# Patient Record
Sex: MALE | Race: BLACK OR AFRICAN AMERICAN | Employment: OTHER | URBAN - METROPOLITAN AREA
[De-identification: names, ages, dates, MRNs, and addresses within clinical notes are randomized per-mention and may not be internally consistent; named-entity substitution may affect disease eponyms.]

---

## 2024-09-07 ENCOUNTER — HOSPITAL ENCOUNTER (INPATIENT)
Facility: HOSPITAL | Age: 39
LOS: 3 days | Discharge: HOME | DRG: 420 | End: 2024-09-10
Attending: FAMILY MEDICINE | Admitting: HOSPITALIST
Payer: MEDICAID

## 2024-09-07 DIAGNOSIS — E11.00 HYPEROSMOLAR HYPERGLYCEMIC STATE (HHS) (HCC): Primary | ICD-10-CM

## 2024-09-07 DIAGNOSIS — E11.65 TYPE 2 DIABETES MELLITUS WITH HYPERGLYCEMIA (HCC): ICD-10-CM

## 2024-09-07 PROBLEM — I10 ESSENTIAL HYPERTENSION: Status: ACTIVE | Noted: 2024-09-07

## 2024-09-07 LAB
ALBUMIN SERPL-MCNC: 3.8 G/DL (ref 3.5–5)
ALBUMIN/GLOB SERPL: 0.5 (ref 1.1–2.2)
ALP SERPL-CCNC: 195 U/L (ref 45–117)
ALT SERPL-CCNC: 80 U/L (ref 12–78)
ANION GAP SERPL CALC-SCNC: 16 MMOL/L (ref 2–12)
ANION GAP SERPL CALC-SCNC: 9 MMOL/L (ref 2–12)
APPEARANCE UR: CLEAR
AST SERPL W P-5'-P-CCNC: 45 U/L (ref 15–37)
BACTERIA URNS QL MICRO: NEGATIVE /HPF
BASE DEFICIT BLDV-SCNC: 5.1 MMOL/L
BASOPHILS # BLD: 0.1 K/UL (ref 0–0.1)
BASOPHILS NFR BLD: 1 % (ref 0–1)
BILIRUB SERPL-MCNC: 0.6 MG/DL (ref 0.2–1)
BILIRUB UR QL: NEGATIVE
BNP SERPL-MCNC: 42 PG/ML
BUN SERPL-MCNC: 25 MG/DL (ref 6–20)
BUN SERPL-MCNC: 28 MG/DL (ref 6–20)
BUN/CREAT SERPL: 15 (ref 12–20)
BUN/CREAT SERPL: 18 (ref 12–20)
CA-I BLD-MCNC: 10 MG/DL (ref 8.5–10.1)
CA-I BLD-MCNC: 9.4 MG/DL (ref 8.5–10.1)
CHLORIDE SERPL-SCNC: 102 MMOL/L (ref 97–108)
CHLORIDE SERPL-SCNC: 89 MMOL/L (ref 97–108)
CO2 SERPL-SCNC: 23 MMOL/L (ref 21–32)
CO2 SERPL-SCNC: 23 MMOL/L (ref 21–32)
COLOR UR: YELLOW
CREAT SERPL-MCNC: 1.37 MG/DL (ref 0.7–1.3)
CREAT SERPL-MCNC: 1.82 MG/DL (ref 0.7–1.3)
DIFFERENTIAL METHOD BLD: ABNORMAL
EOSINOPHIL # BLD: 0 K/UL (ref 0–0.4)
EOSINOPHIL NFR BLD: 0 % (ref 0–7)
EPITH CASTS URNS QL MICRO: ABNORMAL /LPF
ERYTHROCYTE [DISTWIDTH] IN BLOOD BY AUTOMATED COUNT: 11.8 % (ref 11.5–14.5)
EST. AVERAGE GLUCOSE BLD GHB EST-MCNC: 269 MG/DL
GLOBULIN SER CALC-MCNC: 7.1 G/DL (ref 2–4)
GLUCOSE BLD STRIP.AUTO-MCNC: 249 MG/DL (ref 65–100)
GLUCOSE BLD STRIP.AUTO-MCNC: 250 MG/DL (ref 65–100)
GLUCOSE BLD STRIP.AUTO-MCNC: 307 MG/DL (ref 65–100)
GLUCOSE BLD STRIP.AUTO-MCNC: 353 MG/DL (ref 65–100)
GLUCOSE BLD STRIP.AUTO-MCNC: 370 MG/DL (ref 65–100)
GLUCOSE BLD STRIP.AUTO-MCNC: 461 MG/DL (ref 65–100)
GLUCOSE BLD STRIP.AUTO-MCNC: 567 MG/DL (ref 65–100)
GLUCOSE BLD STRIP.AUTO-MCNC: >600 MG/DL (ref 65–100)
GLUCOSE BLD STRIP.AUTO-MCNC: >600 MG/DL (ref 65–100)
GLUCOSE SERPL-MCNC: 309 MG/DL (ref 65–100)
GLUCOSE SERPL-MCNC: 939 MG/DL (ref 65–100)
GLUCOSE UR STRIP.AUTO-MCNC: >1000 MG/DL
HBA1C MFR BLD: 11 % (ref 4–5.6)
HCO3 BLDV-SCNC: 21.8 MMOL/L (ref 22–26)
HCT VFR BLD AUTO: 45.6 % (ref 36.6–50.3)
HGB BLD-MCNC: 15.1 G/DL (ref 12.1–17)
HGB UR QL STRIP: NEGATIVE
IMM GRANULOCYTES # BLD AUTO: 0 K/UL (ref 0–0.04)
IMM GRANULOCYTES NFR BLD AUTO: 0 % (ref 0–0.5)
KETONES UR QL STRIP.AUTO: 50 MG/DL
LEUKOCYTE ESTERASE UR QL STRIP.AUTO: NEGATIVE
LIPASE SERPL-CCNC: 47 U/L (ref 13–75)
LYMPHOCYTES # BLD: 1 K/UL (ref 0.8–3.5)
LYMPHOCYTES NFR BLD: 12 % (ref 12–49)
MAGNESIUM SERPL-MCNC: 2.8 MG/DL (ref 1.6–2.4)
MCH RBC QN AUTO: 28.9 PG (ref 26–34)
MCHC RBC AUTO-ENTMCNC: 33.1 G/DL (ref 30–36.5)
MCV RBC AUTO: 87.2 FL (ref 80–99)
MONOCYTES # BLD: 0.3 K/UL (ref 0–1)
MONOCYTES NFR BLD: 4 % (ref 5–13)
NEUTS SEG # BLD: 7 K/UL (ref 1.8–8)
NEUTS SEG NFR BLD: 83 % (ref 32–75)
NITRITE UR QL STRIP.AUTO: NEGATIVE
PCO2 BLDV: 45.8 MMHG (ref 41–52)
PERFORMED BY:: ABNORMAL
PH BLDV: 7.29 (ref 7.23–7.44)
PH UR STRIP: 5 (ref 5–8)
PHOSPHATE SERPL-MCNC: 3.2 MG/DL (ref 2.6–4.7)
PLATELET # BLD AUTO: 407 K/UL (ref 150–400)
PMV BLD AUTO: 10.4 FL (ref 8.9–12.9)
PO2 BLDV: 43 MMHG (ref 25–40)
POTASSIUM SERPL-SCNC: 4.2 MMOL/L (ref 3.5–5.1)
POTASSIUM SERPL-SCNC: 5.6 MMOL/L (ref 3.5–5.1)
PROT SERPL-MCNC: 10.9 G/DL (ref 6.4–8.2)
PROT UR STRIP-MCNC: NEGATIVE MG/DL
RBC # BLD AUTO: 5.23 M/UL (ref 4.1–5.7)
RBC #/AREA URNS HPF: ABNORMAL /HPF (ref 0–5)
SAO2 % BLDV: 72.3 %
SODIUM SERPL-SCNC: 128 MMOL/L (ref 136–145)
SODIUM SERPL-SCNC: 134 MMOL/L (ref 136–145)
SP GR UR REFRACTOMETRY: 1.01 (ref 1–1.03)
SPECIMEN TYPE: ABNORMAL
URINE CULTURE IF INDICATED: ABNORMAL
UROBILINOGEN UR QL STRIP.AUTO: 0.1 EU/DL (ref 0.2–1)
WBC # BLD AUTO: 8.4 K/UL (ref 4.1–11.1)
WBC URNS QL MICRO: ABNORMAL /HPF (ref 0–4)

## 2024-09-07 PROCEDURE — 2580000003 HC RX 258: Performed by: FAMILY MEDICINE

## 2024-09-07 PROCEDURE — 96374 THER/PROPH/DIAG INJ IV PUSH: CPT

## 2024-09-07 PROCEDURE — 83880 ASSAY OF NATRIURETIC PEPTIDE: CPT

## 2024-09-07 PROCEDURE — 85025 COMPLETE CBC W/AUTO DIFF WBC: CPT

## 2024-09-07 PROCEDURE — 99285 EMERGENCY DEPT VISIT HI MDM: CPT

## 2024-09-07 PROCEDURE — 82962 GLUCOSE BLOOD TEST: CPT

## 2024-09-07 PROCEDURE — 6370000000 HC RX 637 (ALT 250 FOR IP): Performed by: HOSPITALIST

## 2024-09-07 PROCEDURE — 81001 URINALYSIS AUTO W/SCOPE: CPT

## 2024-09-07 PROCEDURE — 86341 ISLET CELL ANTIBODY: CPT

## 2024-09-07 PROCEDURE — 96361 HYDRATE IV INFUSION ADD-ON: CPT

## 2024-09-07 PROCEDURE — 83690 ASSAY OF LIPASE: CPT

## 2024-09-07 PROCEDURE — 2000000000 HC ICU R&B

## 2024-09-07 PROCEDURE — 83735 ASSAY OF MAGNESIUM: CPT

## 2024-09-07 PROCEDURE — 2580000003 HC RX 258: Performed by: HOSPITALIST

## 2024-09-07 PROCEDURE — 83036 HEMOGLOBIN GLYCOSYLATED A1C: CPT

## 2024-09-07 PROCEDURE — 6370000000 HC RX 637 (ALT 250 FOR IP): Performed by: FAMILY MEDICINE

## 2024-09-07 PROCEDURE — 84681 ASSAY OF C-PEPTIDE: CPT

## 2024-09-07 PROCEDURE — 80048 BASIC METABOLIC PNL TOTAL CA: CPT

## 2024-09-07 PROCEDURE — 80053 COMPREHEN METABOLIC PANEL: CPT

## 2024-09-07 PROCEDURE — 96360 HYDRATION IV INFUSION INIT: CPT

## 2024-09-07 PROCEDURE — 6360000002 HC RX W HCPCS: Performed by: HOSPITALIST

## 2024-09-07 PROCEDURE — 84100 ASSAY OF PHOSPHORUS: CPT

## 2024-09-07 RX ORDER — ENOXAPARIN SODIUM 100 MG/ML
30 INJECTION SUBCUTANEOUS 2 TIMES DAILY
Status: DISCONTINUED | OUTPATIENT
Start: 2024-09-07 | End: 2024-09-10 | Stop reason: HOSPADM

## 2024-09-07 RX ORDER — POTASSIUM CHLORIDE 7.45 MG/ML
10 INJECTION INTRAVENOUS PRN
Status: DISCONTINUED | OUTPATIENT
Start: 2024-09-07 | End: 2024-09-10 | Stop reason: HOSPADM

## 2024-09-07 RX ORDER — DEXTROSE MONOHYDRATE AND SODIUM CHLORIDE 5; .45 G/100ML; G/100ML
INJECTION, SOLUTION INTRAVENOUS CONTINUOUS PRN
Status: DISCONTINUED | OUTPATIENT
Start: 2024-09-07 | End: 2024-09-08

## 2024-09-07 RX ORDER — MAGNESIUM SULFATE IN WATER 40 MG/ML
2000 INJECTION, SOLUTION INTRAVENOUS PRN
Status: DISCONTINUED | OUTPATIENT
Start: 2024-09-07 | End: 2024-09-07

## 2024-09-07 RX ORDER — DEXTROSE MONOHYDRATE AND SODIUM CHLORIDE 5; .45 G/100ML; G/100ML
INJECTION, SOLUTION INTRAVENOUS CONTINUOUS PRN
Status: DISCONTINUED | OUTPATIENT
Start: 2024-09-07 | End: 2024-09-07

## 2024-09-07 RX ORDER — SODIUM CHLORIDE 450 MG/100ML
INJECTION, SOLUTION INTRAVENOUS CONTINUOUS
Status: DISCONTINUED | OUTPATIENT
Start: 2024-09-07 | End: 2024-09-08

## 2024-09-07 RX ORDER — 0.9 % SODIUM CHLORIDE 0.9 %
1000 INTRAVENOUS SOLUTION INTRAVENOUS
Status: COMPLETED | OUTPATIENT
Start: 2024-09-07 | End: 2024-09-07

## 2024-09-07 RX ORDER — AMLODIPINE BESYLATE 5 MG/1
5 TABLET ORAL DAILY
Status: ON HOLD | COMMUNITY
End: 2024-09-10 | Stop reason: HOSPADM

## 2024-09-07 RX ORDER — MAGNESIUM SULFATE IN WATER 40 MG/ML
2000 INJECTION, SOLUTION INTRAVENOUS PRN
Status: DISCONTINUED | OUTPATIENT
Start: 2024-09-07 | End: 2024-09-10 | Stop reason: HOSPADM

## 2024-09-07 RX ORDER — ONDANSETRON 4 MG/1
4 TABLET, ORALLY DISINTEGRATING ORAL EVERY 8 HOURS PRN
Status: DISCONTINUED | OUTPATIENT
Start: 2024-09-07 | End: 2024-09-10 | Stop reason: HOSPADM

## 2024-09-07 RX ORDER — ONDANSETRON 2 MG/ML
4 INJECTION INTRAMUSCULAR; INTRAVENOUS EVERY 6 HOURS PRN
Status: DISCONTINUED | OUTPATIENT
Start: 2024-09-07 | End: 2024-09-10 | Stop reason: HOSPADM

## 2024-09-07 RX ORDER — SODIUM CHLORIDE 0.9 % (FLUSH) 0.9 %
5-40 SYRINGE (ML) INJECTION PRN
Status: DISCONTINUED | OUTPATIENT
Start: 2024-09-07 | End: 2024-09-10 | Stop reason: HOSPADM

## 2024-09-07 RX ORDER — SODIUM CHLORIDE 0.9 % (FLUSH) 0.9 %
5-40 SYRINGE (ML) INJECTION EVERY 12 HOURS SCHEDULED
Status: DISCONTINUED | OUTPATIENT
Start: 2024-09-07 | End: 2024-09-10 | Stop reason: HOSPADM

## 2024-09-07 RX ORDER — POTASSIUM CHLORIDE 7.45 MG/ML
10 INJECTION INTRAVENOUS PRN
Status: DISCONTINUED | OUTPATIENT
Start: 2024-09-07 | End: 2024-09-07

## 2024-09-07 RX ORDER — ACETAMINOPHEN 325 MG/1
650 TABLET ORAL EVERY 6 HOURS PRN
Status: DISCONTINUED | OUTPATIENT
Start: 2024-09-07 | End: 2024-09-10 | Stop reason: HOSPADM

## 2024-09-07 RX ORDER — ACETAMINOPHEN 650 MG/1
650 SUPPOSITORY RECTAL EVERY 6 HOURS PRN
Status: DISCONTINUED | OUTPATIENT
Start: 2024-09-07 | End: 2024-09-10 | Stop reason: HOSPADM

## 2024-09-07 RX ORDER — POLYETHYLENE GLYCOL 3350 17 G/17G
17 POWDER, FOR SOLUTION ORAL DAILY PRN
Status: DISCONTINUED | OUTPATIENT
Start: 2024-09-07 | End: 2024-09-10 | Stop reason: HOSPADM

## 2024-09-07 RX ORDER — SODIUM CHLORIDE 9 MG/ML
INJECTION, SOLUTION INTRAVENOUS PRN
Status: DISCONTINUED | OUTPATIENT
Start: 2024-09-07 | End: 2024-09-10 | Stop reason: HOSPADM

## 2024-09-07 RX ORDER — SODIUM CHLORIDE 9 MG/ML
INJECTION, SOLUTION INTRAVENOUS CONTINUOUS
Status: DISCONTINUED | OUTPATIENT
Start: 2024-09-07 | End: 2024-09-07

## 2024-09-07 RX ADMIN — INSULIN HUMAN 8 UNITS/HR: 1 INJECTION, SOLUTION INTRAVENOUS at 18:07

## 2024-09-07 RX ADMIN — SODIUM CHLORIDE: 4.5 INJECTION, SOLUTION INTRAVENOUS at 21:50

## 2024-09-07 RX ADMIN — SODIUM CHLORIDE: 4.5 INJECTION, SOLUTION INTRAVENOUS at 17:39

## 2024-09-07 RX ADMIN — INSULIN HUMAN 10.8 UNITS/HR: 1 INJECTION, SOLUTION INTRAVENOUS at 15:59

## 2024-09-07 RX ADMIN — SODIUM CHLORIDE 1000 ML: 9 INJECTION, SOLUTION INTRAVENOUS at 15:44

## 2024-09-07 RX ADMIN — INSULIN HUMAN 10 UNITS: 100 INJECTION, SOLUTION PARENTERAL at 15:22

## 2024-09-07 RX ADMIN — ENOXAPARIN SODIUM 30 MG: 100 INJECTION SUBCUTANEOUS at 21:09

## 2024-09-07 RX ADMIN — POTASSIUM CHLORIDE 10 MEQ: 7.46 INJECTION, SOLUTION INTRAVENOUS at 23:54

## 2024-09-07 RX ADMIN — POTASSIUM CHLORIDE 10 MEQ: 7.46 INJECTION, SOLUTION INTRAVENOUS at 22:24

## 2024-09-07 RX ADMIN — SODIUM CHLORIDE, PRESERVATIVE FREE 10 ML: 5 INJECTION INTRAVENOUS at 21:10

## 2024-09-07 RX ADMIN — DEXTROSE AND SODIUM CHLORIDE: 5; 450 INJECTION, SOLUTION INTRAVENOUS at 23:02

## 2024-09-07 RX ADMIN — INSULIN HUMAN 10.8 UNITS/HR: 1 INJECTION, SOLUTION INTRAVENOUS at 15:55

## 2024-09-07 RX ADMIN — SODIUM CHLORIDE 1000 ML: 9 INJECTION, SOLUTION INTRAVENOUS at 14:43

## 2024-09-07 ASSESSMENT — PAIN SCALES - GENERAL
PAINLEVEL_OUTOF10: 0
PAINLEVEL_OUTOF10: 8
PAINLEVEL_OUTOF10: 10
PAINLEVEL_OUTOF10: 8

## 2024-09-07 ASSESSMENT — LIFESTYLE VARIABLES
HOW OFTEN DO YOU HAVE A DRINK CONTAINING ALCOHOL: NEVER
HOW MANY STANDARD DRINKS CONTAINING ALCOHOL DO YOU HAVE ON A TYPICAL DAY: PATIENT DOES NOT DRINK

## 2024-09-07 ASSESSMENT — PAIN DESCRIPTION - LOCATION
LOCATION: ABDOMEN
LOCATION: ABDOMEN

## 2024-09-07 ASSESSMENT — PAIN DESCRIPTION - ORIENTATION
ORIENTATION: RIGHT;LOWER
ORIENTATION: RIGHT;LOWER

## 2024-09-07 ASSESSMENT — PAIN - FUNCTIONAL ASSESSMENT: PAIN_FUNCTIONAL_ASSESSMENT: 0-10

## 2024-09-08 LAB
ANION GAP SERPL CALC-SCNC: 8 MMOL/L (ref 2–12)
BUN SERPL-MCNC: 22 MG/DL (ref 6–20)
BUN/CREAT SERPL: 21 (ref 12–20)
CA-I BLD-MCNC: 8.5 MG/DL (ref 8.5–10.1)
CHLORIDE SERPL-SCNC: 104 MMOL/L (ref 97–108)
CO2 SERPL-SCNC: 23 MMOL/L (ref 21–32)
CREAT SERPL-MCNC: 1.06 MG/DL (ref 0.7–1.3)
GLUCOSE BLD STRIP.AUTO-MCNC: 149 MG/DL (ref 65–100)
GLUCOSE BLD STRIP.AUTO-MCNC: 165 MG/DL (ref 65–100)
GLUCOSE BLD STRIP.AUTO-MCNC: 177 MG/DL (ref 65–100)
GLUCOSE BLD STRIP.AUTO-MCNC: 219 MG/DL (ref 65–100)
GLUCOSE BLD STRIP.AUTO-MCNC: 222 MG/DL (ref 65–100)
GLUCOSE BLD STRIP.AUTO-MCNC: 231 MG/DL (ref 65–100)
GLUCOSE BLD STRIP.AUTO-MCNC: 234 MG/DL (ref 65–100)
GLUCOSE BLD STRIP.AUTO-MCNC: 238 MG/DL (ref 65–100)
GLUCOSE BLD STRIP.AUTO-MCNC: 240 MG/DL (ref 65–100)
GLUCOSE BLD STRIP.AUTO-MCNC: 245 MG/DL (ref 65–100)
GLUCOSE BLD STRIP.AUTO-MCNC: 246 MG/DL (ref 65–100)
GLUCOSE BLD STRIP.AUTO-MCNC: 247 MG/DL (ref 65–100)
GLUCOSE BLD STRIP.AUTO-MCNC: 247 MG/DL (ref 65–100)
GLUCOSE BLD STRIP.AUTO-MCNC: 248 MG/DL (ref 65–100)
GLUCOSE BLD STRIP.AUTO-MCNC: 355 MG/DL (ref 65–100)
GLUCOSE BLD STRIP.AUTO-MCNC: 370 MG/DL (ref 65–100)
GLUCOSE BLD STRIP.AUTO-MCNC: 375 MG/DL (ref 65–100)
GLUCOSE BLD STRIP.AUTO-MCNC: 397 MG/DL (ref 65–100)
GLUCOSE BLD STRIP.AUTO-MCNC: 427 MG/DL (ref 65–100)
GLUCOSE SERPL-MCNC: 245 MG/DL (ref 65–100)
MAGNESIUM SERPL-MCNC: 2.3 MG/DL (ref 1.6–2.4)
PERFORMED BY:: ABNORMAL
PHOSPHATE SERPL-MCNC: 3.2 MG/DL (ref 2.6–4.7)
POTASSIUM SERPL-SCNC: 3.8 MMOL/L (ref 3.5–5.1)
SODIUM SERPL-SCNC: 135 MMOL/L (ref 136–145)

## 2024-09-08 PROCEDURE — 6360000002 HC RX W HCPCS: Performed by: HOSPITALIST

## 2024-09-08 PROCEDURE — 2580000003 HC RX 258: Performed by: HOSPITALIST

## 2024-09-08 PROCEDURE — 80048 BASIC METABOLIC PNL TOTAL CA: CPT

## 2024-09-08 PROCEDURE — 83735 ASSAY OF MAGNESIUM: CPT

## 2024-09-08 PROCEDURE — 6370000000 HC RX 637 (ALT 250 FOR IP): Performed by: HOSPITALIST

## 2024-09-08 PROCEDURE — 2000000000 HC ICU R&B

## 2024-09-08 PROCEDURE — 82962 GLUCOSE BLOOD TEST: CPT

## 2024-09-08 PROCEDURE — 6370000000 HC RX 637 (ALT 250 FOR IP): Performed by: INTERNAL MEDICINE

## 2024-09-08 PROCEDURE — 36415 COLL VENOUS BLD VENIPUNCTURE: CPT

## 2024-09-08 PROCEDURE — 84100 ASSAY OF PHOSPHORUS: CPT

## 2024-09-08 RX ORDER — INSULIN GLARGINE 100 [IU]/ML
35 INJECTION, SOLUTION SUBCUTANEOUS 2 TIMES DAILY
Status: DISCONTINUED | OUTPATIENT
Start: 2024-09-08 | End: 2024-09-08

## 2024-09-08 RX ORDER — INSULIN LISPRO 100 [IU]/ML
0-8 INJECTION, SOLUTION INTRAVENOUS; SUBCUTANEOUS
Status: DISCONTINUED | OUTPATIENT
Start: 2024-09-08 | End: 2024-09-10 | Stop reason: HOSPADM

## 2024-09-08 RX ORDER — GLUCAGON 1 MG/ML
1 KIT INJECTION PRN
Status: DISCONTINUED | OUTPATIENT
Start: 2024-09-08 | End: 2024-09-10 | Stop reason: HOSPADM

## 2024-09-08 RX ORDER — INSULIN GLARGINE 100 [IU]/ML
30 INJECTION, SOLUTION SUBCUTANEOUS 2 TIMES DAILY
Status: DISCONTINUED | OUTPATIENT
Start: 2024-09-08 | End: 2024-09-08

## 2024-09-08 RX ORDER — INSULIN LISPRO 100 [IU]/ML
0-4 INJECTION, SOLUTION INTRAVENOUS; SUBCUTANEOUS NIGHTLY
Status: DISCONTINUED | OUTPATIENT
Start: 2024-09-08 | End: 2024-09-10 | Stop reason: HOSPADM

## 2024-09-08 RX ORDER — INSULIN GLARGINE 100 [IU]/ML
25 INJECTION, SOLUTION SUBCUTANEOUS 2 TIMES DAILY
Status: DISCONTINUED | OUTPATIENT
Start: 2024-09-08 | End: 2024-09-09

## 2024-09-08 RX ORDER — DEXTROSE MONOHYDRATE 100 MG/ML
INJECTION, SOLUTION INTRAVENOUS CONTINUOUS PRN
Status: DISCONTINUED | OUTPATIENT
Start: 2024-09-08 | End: 2024-09-10 | Stop reason: HOSPADM

## 2024-09-08 RX ORDER — INSULIN GLARGINE 100 [IU]/ML
25 INJECTION, SOLUTION SUBCUTANEOUS 2 TIMES DAILY
Status: DISCONTINUED | OUTPATIENT
Start: 2024-09-09 | End: 2024-09-08

## 2024-09-08 RX ORDER — INSULIN LISPRO 100 [IU]/ML
15 INJECTION, SOLUTION INTRAVENOUS; SUBCUTANEOUS
Status: DISCONTINUED | OUTPATIENT
Start: 2024-09-08 | End: 2024-09-10 | Stop reason: HOSPADM

## 2024-09-08 RX ADMIN — DEXTROSE AND SODIUM CHLORIDE: 5; 450 INJECTION, SOLUTION INTRAVENOUS at 13:08

## 2024-09-08 RX ADMIN — POTASSIUM CHLORIDE 10 MEQ: 7.46 INJECTION, SOLUTION INTRAVENOUS at 07:52

## 2024-09-08 RX ADMIN — INSULIN HUMAN 11.3 UNITS/HR: 1 INJECTION, SOLUTION INTRAVENOUS at 15:08

## 2024-09-08 RX ADMIN — POTASSIUM CHLORIDE 10 MEQ: 7.46 INJECTION, SOLUTION INTRAVENOUS at 06:45

## 2024-09-08 RX ADMIN — POTASSIUM CHLORIDE 10 MEQ: 7.46 INJECTION, SOLUTION INTRAVENOUS at 00:54

## 2024-09-08 RX ADMIN — DEXTROSE AND SODIUM CHLORIDE: 5; 450 INJECTION, SOLUTION INTRAVENOUS at 05:48

## 2024-09-08 RX ADMIN — INSULIN LISPRO 15 UNITS: 100 INJECTION, SOLUTION INTRAVENOUS; SUBCUTANEOUS at 18:51

## 2024-09-08 RX ADMIN — INSULIN HUMAN 5.3 UNITS/HR: 1 INJECTION, SOLUTION INTRAVENOUS at 03:10

## 2024-09-08 RX ADMIN — SODIUM CHLORIDE, PRESERVATIVE FREE 10 ML: 5 INJECTION INTRAVENOUS at 07:55

## 2024-09-08 RX ADMIN — SODIUM CHLORIDE, PRESERVATIVE FREE 10 ML: 5 INJECTION INTRAVENOUS at 21:26

## 2024-09-08 RX ADMIN — INSULIN GLARGINE 25 UNITS: 100 INJECTION, SOLUTION SUBCUTANEOUS at 17:25

## 2024-09-08 RX ADMIN — INSULIN LISPRO 4 UNITS: 100 INJECTION, SOLUTION INTRAVENOUS; SUBCUTANEOUS at 21:39

## 2024-09-08 RX ADMIN — ENOXAPARIN SODIUM 30 MG: 100 INJECTION SUBCUTANEOUS at 08:35

## 2024-09-08 RX ADMIN — POTASSIUM CHLORIDE 10 MEQ: 7.46 INJECTION, SOLUTION INTRAVENOUS at 08:58

## 2024-09-08 ASSESSMENT — PAIN SCALES - GENERAL
PAINLEVEL_OUTOF10: 0

## 2024-09-09 LAB
ANION GAP SERPL CALC-SCNC: 7 MMOL/L (ref 2–12)
BUN SERPL-MCNC: 17 MG/DL (ref 6–20)
BUN/CREAT SERPL: 16 (ref 12–20)
CA-I BLD-MCNC: 8.5 MG/DL (ref 8.5–10.1)
CHLORIDE SERPL-SCNC: 103 MMOL/L (ref 97–108)
CO2 SERPL-SCNC: 22 MMOL/L (ref 21–32)
CREAT SERPL-MCNC: 1.04 MG/DL (ref 0.7–1.3)
ERYTHROCYTE [DISTWIDTH] IN BLOOD BY AUTOMATED COUNT: 12.2 % (ref 11.5–14.5)
GLUCOSE BLD STRIP.AUTO-MCNC: 284 MG/DL (ref 65–100)
GLUCOSE BLD STRIP.AUTO-MCNC: 285 MG/DL (ref 65–100)
GLUCOSE BLD STRIP.AUTO-MCNC: 340 MG/DL (ref 65–100)
GLUCOSE BLD STRIP.AUTO-MCNC: 428 MG/DL (ref 65–100)
GLUCOSE SERPL-MCNC: 347 MG/DL (ref 65–100)
HCT VFR BLD AUTO: 40.1 % (ref 36.6–50.3)
HGB BLD-MCNC: 13.2 G/DL (ref 12.1–17)
MAGNESIUM SERPL-MCNC: 2.2 MG/DL (ref 1.6–2.4)
MCH RBC QN AUTO: 28.9 PG (ref 26–34)
MCHC RBC AUTO-ENTMCNC: 32.9 G/DL (ref 30–36.5)
MCV RBC AUTO: 87.7 FL (ref 80–99)
NRBC # BLD: 0 K/UL (ref 0–0.01)
NRBC BLD-RTO: 0 PER 100 WBC
PERFORMED BY:: ABNORMAL
PHOSPHATE SERPL-MCNC: 3.8 MG/DL (ref 2.6–4.7)
PLATELET # BLD AUTO: 321 K/UL (ref 150–400)
PMV BLD AUTO: 10.1 FL (ref 8.9–12.9)
POTASSIUM SERPL-SCNC: 4.2 MMOL/L (ref 3.5–5.1)
RBC # BLD AUTO: 4.57 M/UL (ref 4.1–5.7)
SODIUM SERPL-SCNC: 132 MMOL/L (ref 136–145)
TSH SERPL DL<=0.05 MIU/L-ACNC: 3.17 UIU/ML (ref 0.36–3.74)
WBC # BLD AUTO: 7.1 K/UL (ref 4.1–11.1)

## 2024-09-09 PROCEDURE — 82962 GLUCOSE BLOOD TEST: CPT

## 2024-09-09 PROCEDURE — 6370000000 HC RX 637 (ALT 250 FOR IP): Performed by: INTERNAL MEDICINE

## 2024-09-09 PROCEDURE — 85027 COMPLETE CBC AUTOMATED: CPT

## 2024-09-09 PROCEDURE — 83735 ASSAY OF MAGNESIUM: CPT

## 2024-09-09 PROCEDURE — 36415 COLL VENOUS BLD VENIPUNCTURE: CPT

## 2024-09-09 PROCEDURE — 2580000003 HC RX 258: Performed by: HOSPITALIST

## 2024-09-09 PROCEDURE — 84100 ASSAY OF PHOSPHORUS: CPT

## 2024-09-09 PROCEDURE — 6370000000 HC RX 637 (ALT 250 FOR IP): Performed by: HOSPITALIST

## 2024-09-09 PROCEDURE — 6360000002 HC RX W HCPCS: Performed by: HOSPITALIST

## 2024-09-09 PROCEDURE — 1100000000 HC RM PRIVATE

## 2024-09-09 PROCEDURE — 80048 BASIC METABOLIC PNL TOTAL CA: CPT

## 2024-09-09 PROCEDURE — 84443 ASSAY THYROID STIM HORMONE: CPT

## 2024-09-09 RX ORDER — INSULIN GLARGINE 100 [IU]/ML
35 INJECTION, SOLUTION SUBCUTANEOUS 2 TIMES DAILY
Status: DISCONTINUED | OUTPATIENT
Start: 2024-09-09 | End: 2024-09-10 | Stop reason: HOSPADM

## 2024-09-09 RX ADMIN — SODIUM CHLORIDE, PRESERVATIVE FREE 10 ML: 5 INJECTION INTRAVENOUS at 22:29

## 2024-09-09 RX ADMIN — INSULIN LISPRO 4 UNITS: 100 INJECTION, SOLUTION INTRAVENOUS; SUBCUTANEOUS at 16:53

## 2024-09-09 RX ADMIN — INSULIN GLARGINE 25 UNITS: 100 INJECTION, SOLUTION SUBCUTANEOUS at 08:32

## 2024-09-09 RX ADMIN — METFORMIN HYDROCHLORIDE 500 MG: 500 TABLET ORAL at 16:53

## 2024-09-09 RX ADMIN — INSULIN LISPRO 15 UNITS: 100 INJECTION, SOLUTION INTRAVENOUS; SUBCUTANEOUS at 08:32

## 2024-09-09 RX ADMIN — ENOXAPARIN SODIUM 30 MG: 100 INJECTION SUBCUTANEOUS at 08:32

## 2024-09-09 RX ADMIN — INSULIN LISPRO 8 UNITS: 100 INJECTION, SOLUTION INTRAVENOUS; SUBCUTANEOUS at 13:05

## 2024-09-09 RX ADMIN — ENOXAPARIN SODIUM 30 MG: 100 INJECTION SUBCUTANEOUS at 22:21

## 2024-09-09 RX ADMIN — SODIUM CHLORIDE, PRESERVATIVE FREE 10 ML: 5 INJECTION INTRAVENOUS at 08:37

## 2024-09-09 RX ADMIN — INSULIN GLARGINE 35 UNITS: 100 INJECTION, SOLUTION SUBCUTANEOUS at 22:20

## 2024-09-09 RX ADMIN — INSULIN LISPRO 15 UNITS: 100 INJECTION, SOLUTION INTRAVENOUS; SUBCUTANEOUS at 13:05

## 2024-09-09 RX ADMIN — INSULIN LISPRO 15 UNITS: 100 INJECTION, SOLUTION INTRAVENOUS; SUBCUTANEOUS at 16:53

## 2024-09-09 RX ADMIN — INSULIN LISPRO 6 UNITS: 100 INJECTION, SOLUTION INTRAVENOUS; SUBCUTANEOUS at 08:32

## 2024-09-09 ASSESSMENT — PAIN SCALES - GENERAL
PAINLEVEL_OUTOF10: 0

## 2024-09-10 VITALS
HEART RATE: 87 BPM | HEIGHT: 72 IN | TEMPERATURE: 98.2 F | WEIGHT: 315 LBS | SYSTOLIC BLOOD PRESSURE: 137 MMHG | OXYGEN SATURATION: 95 % | DIASTOLIC BLOOD PRESSURE: 82 MMHG | BODY MASS INDEX: 42.66 KG/M2 | RESPIRATION RATE: 18 BRPM

## 2024-09-10 LAB
ALBUMIN SERPL-MCNC: 2.9 G/DL (ref 3.5–5)
ANION GAP SERPL CALC-SCNC: 7 MMOL/L (ref 2–12)
BUN SERPL-MCNC: 15 MG/DL (ref 6–20)
BUN/CREAT SERPL: 17 (ref 12–20)
CA-I BLD-MCNC: 8.5 MG/DL (ref 8.5–10.1)
CHLORIDE SERPL-SCNC: 103 MMOL/L (ref 97–108)
CO2 SERPL-SCNC: 24 MMOL/L (ref 21–32)
CREAT SERPL-MCNC: 0.88 MG/DL (ref 0.7–1.3)
GLUCOSE BLD STRIP.AUTO-MCNC: 232 MG/DL (ref 65–100)
GLUCOSE BLD STRIP.AUTO-MCNC: 345 MG/DL (ref 65–100)
GLUCOSE SERPL-MCNC: 300 MG/DL (ref 65–100)
MAGNESIUM SERPL-MCNC: 2.1 MG/DL (ref 1.6–2.4)
PERFORMED BY:: ABNORMAL
PERFORMED BY:: ABNORMAL
PHOSPHATE SERPL-MCNC: 3.7 MG/DL (ref 2.6–4.7)
POTASSIUM SERPL-SCNC: 3.9 MMOL/L (ref 3.5–5.1)
SODIUM SERPL-SCNC: 134 MMOL/L (ref 136–145)

## 2024-09-10 PROCEDURE — 82962 GLUCOSE BLOOD TEST: CPT

## 2024-09-10 PROCEDURE — 6370000000 HC RX 637 (ALT 250 FOR IP): Performed by: INTERNAL MEDICINE

## 2024-09-10 PROCEDURE — 36415 COLL VENOUS BLD VENIPUNCTURE: CPT

## 2024-09-10 PROCEDURE — 2580000003 HC RX 258: Performed by: HOSPITALIST

## 2024-09-10 PROCEDURE — 6360000002 HC RX W HCPCS: Performed by: HOSPITALIST

## 2024-09-10 PROCEDURE — 80069 RENAL FUNCTION PANEL: CPT

## 2024-09-10 PROCEDURE — 83735 ASSAY OF MAGNESIUM: CPT

## 2024-09-10 PROCEDURE — 6370000000 HC RX 637 (ALT 250 FOR IP): Performed by: HOSPITALIST

## 2024-09-10 RX ORDER — INSULIN GLARGINE 100 [IU]/ML
35 INJECTION, SOLUTION SUBCUTANEOUS 2 TIMES DAILY
Qty: 10 ML | Refills: 3 | Status: SHIPPED | OUTPATIENT
Start: 2024-09-10

## 2024-09-10 RX ADMIN — INSULIN LISPRO 2 UNITS: 100 INJECTION, SOLUTION INTRAVENOUS; SUBCUTANEOUS at 12:32

## 2024-09-10 RX ADMIN — INSULIN LISPRO 15 UNITS: 100 INJECTION, SOLUTION INTRAVENOUS; SUBCUTANEOUS at 12:31

## 2024-09-10 RX ADMIN — ENOXAPARIN SODIUM 30 MG: 100 INJECTION SUBCUTANEOUS at 08:28

## 2024-09-10 RX ADMIN — INSULIN LISPRO 6 UNITS: 100 INJECTION, SOLUTION INTRAVENOUS; SUBCUTANEOUS at 08:27

## 2024-09-10 RX ADMIN — INSULIN LISPRO 15 UNITS: 100 INJECTION, SOLUTION INTRAVENOUS; SUBCUTANEOUS at 08:27

## 2024-09-10 RX ADMIN — INSULIN GLARGINE 35 UNITS: 100 INJECTION, SOLUTION SUBCUTANEOUS at 08:28

## 2024-09-10 RX ADMIN — METFORMIN HYDROCHLORIDE 500 MG: 500 TABLET ORAL at 08:27

## 2024-09-10 RX ADMIN — SODIUM CHLORIDE, PRESERVATIVE FREE 10 ML: 5 INJECTION INTRAVENOUS at 08:45

## 2024-09-11 LAB — GAD65 AB SER-ACNC: <5 U/ML (ref 0–5)

## 2024-09-25 ENCOUNTER — OFFICE VISIT (OUTPATIENT)
Age: 39
End: 2024-09-25
Payer: MEDICAID

## 2024-09-25 VITALS
HEART RATE: 90 BPM | OXYGEN SATURATION: 98 % | SYSTOLIC BLOOD PRESSURE: 152 MMHG | DIASTOLIC BLOOD PRESSURE: 95 MMHG | HEIGHT: 72 IN | WEIGHT: 315 LBS | TEMPERATURE: 97.2 F | BODY MASS INDEX: 42.66 KG/M2

## 2024-09-25 DIAGNOSIS — E11.65 TYPE 2 DIABETES MELLITUS WITH HYPERGLYCEMIA, WITHOUT LONG-TERM CURRENT USE OF INSULIN (HCC): Primary | ICD-10-CM

## 2024-09-25 DIAGNOSIS — E11.65 TYPE 2 DIABETES MELLITUS WITH HYPERGLYCEMIA, WITHOUT LONG-TERM CURRENT USE OF INSULIN (HCC): ICD-10-CM

## 2024-09-25 LAB
ANION GAP SERPL CALC-SCNC: 3 MMOL/L (ref 2–12)
BUN SERPL-MCNC: 12 MG/DL (ref 6–20)
BUN/CREAT SERPL: 12 (ref 12–20)
CALCIUM SERPL-MCNC: 9.2 MG/DL (ref 8.5–10.1)
CHLORIDE SERPL-SCNC: 106 MMOL/L (ref 97–108)
CO2 SERPL-SCNC: 30 MMOL/L (ref 21–32)
CREAT SERPL-MCNC: 1.02 MG/DL (ref 0.7–1.3)
GLUCOSE SERPL-MCNC: 87 MG/DL (ref 65–100)
POTASSIUM SERPL-SCNC: 4.2 MMOL/L (ref 3.5–5.1)
SODIUM SERPL-SCNC: 139 MMOL/L (ref 136–145)

## 2024-09-25 PROCEDURE — 99204 OFFICE O/P NEW MOD 45 MIN: CPT | Performed by: INTERNAL MEDICINE

## 2024-09-25 PROCEDURE — 3080F DIAST BP >= 90 MM HG: CPT | Performed by: INTERNAL MEDICINE

## 2024-09-25 PROCEDURE — 3077F SYST BP >= 140 MM HG: CPT | Performed by: INTERNAL MEDICINE

## 2024-09-25 RX ORDER — SEMAGLUTIDE 0.68 MG/ML
INJECTION, SOLUTION SUBCUTANEOUS
Qty: 9 ML | Refills: 3 | Status: SHIPPED | OUTPATIENT
Start: 2024-09-25

## 2024-09-25 RX ORDER — GLUCOSAMINE HCL/CHONDROITIN SU 500-400 MG
CAPSULE ORAL
Qty: 100 STRIP | Refills: 3 | Status: SHIPPED | OUTPATIENT
Start: 2024-09-25

## 2024-09-25 RX ORDER — METFORMIN HCL 500 MG
1000 TABLET, EXTENDED RELEASE 24 HR ORAL 2 TIMES DAILY
Qty: 180 TABLET | Refills: 3 | Status: SHIPPED | OUTPATIENT
Start: 2024-09-25

## 2024-09-25 RX ORDER — LANCETS 30 GAUGE
EACH MISCELLANEOUS
Qty: 100 EACH | Refills: 3 | Status: SHIPPED | OUTPATIENT
Start: 2024-09-25

## 2024-09-27 ENCOUNTER — OFFICE VISIT (OUTPATIENT)
Age: 39
End: 2024-09-27
Payer: MEDICAID

## 2024-09-27 DIAGNOSIS — E11.65 TYPE 2 DIABETES MELLITUS WITH HYPERGLYCEMIA, WITHOUT LONG-TERM CURRENT USE OF INSULIN (HCC): Primary | ICD-10-CM

## 2024-09-27 LAB — C PEPTIDE SERPL-MCNC: 1.6 NG/ML (ref 1.1–4.4)

## 2024-09-27 PROCEDURE — G0108 DIAB MANAGE TRN  PER INDIV: HCPCS | Performed by: DIETITIAN, REGISTERED

## 2024-09-30 ENCOUNTER — TELEPHONE (OUTPATIENT)
Age: 39
End: 2024-09-30

## 2024-09-30 RX ORDER — LIRAGLUTIDE 6 MG/ML
INJECTION SUBCUTANEOUS
Qty: 6 ML | Refills: 0 | Status: SHIPPED | OUTPATIENT
Start: 2024-09-30 | End: 2024-10-03

## 2024-09-30 NOTE — TELEPHONE ENCOUNTER
Patient would like to discuss ozempic stating a pa was to be done but pharmacy states it has not cleared

## 2024-09-30 NOTE — TELEPHONE ENCOUNTER
Pt was called and he stated he got a notice from his pharmacy that the PA got approved and his medication is ready for .

## 2024-10-02 ENCOUNTER — TELEPHONE (OUTPATIENT)
Age: 39
End: 2024-10-02

## 2024-10-02 DIAGNOSIS — E11.65 TYPE 2 DIABETES MELLITUS WITH HYPERGLYCEMIA, WITHOUT LONG-TERM CURRENT USE OF INSULIN (HCC): Primary | ICD-10-CM

## 2024-10-02 NOTE — TELEPHONE ENCOUNTER
----- Message from Dr. Anayeli Ascencio MD sent at 10/1/2024 10:08 AM EDT -----  Your recent C-peptide test results came back within the normal range, but they are on the lower end of that range. The C-peptide test helps us understand how much insulin your body is producing. Since your levels are borderline, I'd like to repeat the test in 3 months to ensure everything is still within a healthy range.  For the next test, it will be helpful to have it done after a meal. This can provide a clearer picture of how your body is responding to food and producing insulin.    Please order c peptide

## 2024-10-02 NOTE — TELEPHONE ENCOUNTER
Per Dr. Ascencio, informed pt of result note, as noted above. Pt verbalized understanding. He stated the he was prescribed Ozempic to start which needed a PA. He got a call that the PA was approved but then the pharmacy had another med for him?advised pt that I will have to check with Dr. Ascencio and her usual nurse as I am not sure what happened.

## 2024-10-03 RX ORDER — SEMAGLUTIDE 0.68 MG/ML
INJECTION, SOLUTION SUBCUTANEOUS
Qty: 9 ML | Refills: 3 | Status: SHIPPED | OUTPATIENT
Start: 2024-10-03

## 2024-10-03 NOTE — TELEPHONE ENCOUNTER
Hi,     Pt is concerned no one from our clinic has reached back out to him pertaining to his rx: OZEMPIC. Pt is requesting a call back soon to know when he can fill his rx.   Please return call.     Thank you.

## 2024-10-03 NOTE — TELEPHONE ENCOUNTER
Informed pt Ozempic has been sent in. Pt verbalized understanding with no further questions or concerns at this time.

## 2024-10-10 ENCOUNTER — TELEPHONE (OUTPATIENT)
Age: 39
End: 2024-10-10

## 2024-10-10 DIAGNOSIS — E11.65 TYPE 2 DIABETES MELLITUS WITH HYPERGLYCEMIA, WITHOUT LONG-TERM CURRENT USE OF INSULIN (HCC): ICD-10-CM

## 2024-10-10 RX ORDER — SEMAGLUTIDE 0.68 MG/ML
INJECTION, SOLUTION SUBCUTANEOUS
Qty: 9 ML | Refills: 3 | Status: SHIPPED | OUTPATIENT
Start: 2024-10-10 | End: 2024-10-10

## 2024-10-10 RX ORDER — LIRAGLUTIDE 6 MG/ML
INJECTION SUBCUTANEOUS
Qty: 6 ML | Refills: 3 | Status: SHIPPED | OUTPATIENT
Start: 2024-10-10

## 2024-10-10 NOTE — TELEPHONE ENCOUNTER
Hi,    Pt says [facility] pharmacy does not have his prescription for rx: OZEMPIC. I called [facility], spoke w/Mary (pharmacy tech) who confirmed they need a prescription for the rx so it can be processed again.     Please resend prescription to [facility] in [place].     Thank you.

## 2024-10-10 NOTE — TELEPHONE ENCOUNTER
I was made aware by pharmacist at [facility] that Ozempic is not covered, victoza or byetta is preferred.

## 2024-10-10 NOTE — TELEPHONE ENCOUNTER
I called and spoke to the pharmacist at [facility] and was made aware that Ozempic is not covered, victoza or byetta is preferred. Pt was made aware

## 2024-11-06 ENCOUNTER — NURSE ONLY (OUTPATIENT)
Age: 39
End: 2024-11-06
Payer: MEDICAID

## 2024-11-06 DIAGNOSIS — E11.65 TYPE 2 DIABETES MELLITUS WITH HYPERGLYCEMIA, WITHOUT LONG-TERM CURRENT USE OF INSULIN (HCC): Primary | ICD-10-CM

## 2024-11-06 PROCEDURE — G0109 DIAB MANAGE TRN IND/GROUP: HCPCS | Performed by: DIETITIAN, REGISTERED

## 2024-11-07 NOTE — PROGRESS NOTES
[facility]  Diabetes Self-Management Education & Support Program  Encounter Note      SUMMARY  Diabetes self-care management training was completed related to healthy eating and monitoring. The participant will return on November 13 to continue DSMES related to taking medications and physical activity. The participant did identify SMART Goal(s) and will practice knowledge and skills related to healthy eating and monitoring to improve diabetes self-management.        EVALUATION:  Mr. Duque missed class one and will make up class over the next month. Today, he shared that he likes to eat and has been experimenting with his meals to see how his intakes impact his blood glucose. He shared that he does not eat breakfast. His largest meal is dinner. He participated in discussions and asked questions. He was engaged in learning and applying information.     RECOMMENDATIONS:  Monitor and log BG at least 2x/d: FBG and 2H PP largest meal  Eat 3 meals per day   Use Healthy Plate to plan balanced meals  Practice label reading and CHO counting  Aim for 60 grams CHO per meal  Make up Class One    TOPICS DISCUSSED TODAY:  WHAT CAN I EAT? 60  HOW CAN BLOOD GLUCOSE MONITORING HELP ME? 60      Next provider visit is scheduled for 12/23/2024 margo Ascencio       SMART GOAL(S)  Assess blood glucose readings to determine if readings are at target 2H PP dinner seven  times over the next week.  ACHIEVEMENT OF GOAL(S) : 0-24%         DATE DSMES TOPIC EVALUATION     11/6/2024 WHAT CAN I EAT?   General principles   Determining a healthy weight   Nutritional terms & tools   Healthy Plate method   Carbohydrate Counting   Reading food labels   Free apps      The participant can  Use Healthy Plate principles in constructing meals: Yes  Read food labels in choosing acceptable foods: Yes    The participant needs to address eating 3 meals per day, plan balanced meals, practice label reading and carbohydrate counting.

## 2024-11-11 DIAGNOSIS — E11.65 TYPE 2 DIABETES MELLITUS WITH HYPERGLYCEMIA, WITHOUT LONG-TERM CURRENT USE OF INSULIN (HCC): ICD-10-CM

## 2024-11-11 RX ORDER — BLOOD-GLUCOSE METER
EACH MISCELLANEOUS
Qty: 1 KIT | Refills: 0 | Status: SHIPPED | OUTPATIENT
Start: 2024-11-11

## 2024-11-13 ENCOUNTER — NURSE ONLY (OUTPATIENT)
Age: 39
End: 2024-11-13
Payer: MEDICAID

## 2024-11-13 DIAGNOSIS — E11.9 TYPE 2 DIABETES MELLITUS WITHOUT COMPLICATION, WITHOUT LONG-TERM CURRENT USE OF INSULIN (HCC): Primary | ICD-10-CM

## 2024-11-13 PROCEDURE — G0109 DIAB MANAGE TRN IND/GROUP: HCPCS

## 2024-11-13 NOTE — PROGRESS NOTES
[facility]  Diabetes Self-Management Education & Support Program  Encounter Note      SUMMARY  Diabetes self-care management training was completed related to taking medications and physical activity. The participant will return on November 20 to continue DSMES related to healthy coping and problem solving. The participant did identify SMART Goal(s) and will practice knowledge and skills related to reducing risks, healthy eating and monitoring, and being active and medications to improve diabetes self-management.        EVALUATION:  Participant shared that  he started checking blood glucose levels 2 hours post meals.  He states that his glucose was around 100.  He states that he takes his medication as prescribed for full benefit.  Verbalized understanding of hypoglycemia and treatment.  He participated in group physical activity during class today.    RECOMMENDATIONS:  Continue physical activity as tolerated     TOPICS DISCUSSED TODAY:  HOW DO MY DIABETES MEDICATIONS WORK? 60  HOW DOES PHYSICAL ACTIVITY AFFECT MY DIABETES? 60      Next provider visit is scheduled for   Future Appointments         Provider Specialty Dept Phone    11/20/2024 2:00 PM DIABETES GROUP CLASS [facility] [phone]    12/16/2024 11:00 AM SPEC CDE LAB Endocrinology [phone]    12/23/2024 1:45 PM Anayeli Ascencio MD Endocrinology [phone]                SMART GOAL(S)  Increase physical activity by stretching for 10 minutes 7 times over the next week.  ACHIEVEMENT OF GOAL(S) : 0-24%         DATE DSMES TOPIC EVALUATION     11/13/2024 HOW DO MY DIABETES MEDICATIONS WORK?   Type 1 medications & methods   Insulin injections   Injection sites   Type 2 medications   Oral agents   GLP-1 agonists   Hypoglycemia symptoms & treatment   Glucagon emergency kits   General guidance regarding insulin use whether Type 1, 2 or gestational diabetes   Storage of insulin   Disposal    Traveling with medications

## 2024-11-20 ENCOUNTER — NURSE ONLY (OUTPATIENT)
Age: 39
End: 2024-11-20
Payer: MEDICAID

## 2024-11-20 DIAGNOSIS — E11.9 TYPE 2 DIABETES MELLITUS WITHOUT COMPLICATION, WITHOUT LONG-TERM CURRENT USE OF INSULIN (HCC): Primary | ICD-10-CM

## 2024-11-20 PROCEDURE — G0109 DIAB MANAGE TRN IND/GROUP: HCPCS | Performed by: DIETITIAN, REGISTERED

## 2024-11-20 NOTE — PROGRESS NOTES
[facility]  Diabetes Self-Management Education & Support Program  Encounter Note      SUMMARY  Diabetes self-care management training was completed related to healthy coping and problem solving. The participant will return on December 02 to continue DSMES related to reducing risks and healthy eating. The participant will practice knowledge and skills related to healthy eating, monitoring, being active, medications, healthy coping, and problem solving to improve diabetes self-management.        EVALUATION:  Mr. Duque identifies a support team for managing his stress. He shared that writing music, stories and journaling help him relieve stress. He makes lists to help him stay on track for managing his diabetes. Mr. Duque participated in group guided meditation activity and practiced problem solving techniques.       RECOMMENDATIONS:  Obtain Medical Alert ID  Practice Problem solving skills to manage common self care issues  Use Weekly BG log/track BG, Meals and Activity to identify BG anomalies  Create a diabetes emergency to go bag/kit      TOPICS DISCUSSED TODAY:  HOW DO I FIND SUPPORT TO TACKLE THIS CONDITION? 60  HOW DO I FIGURE OUT WHAT'S INFLUENCING MY BLOOD GLUCOSES? 60      Next provider visit is scheduled for 12/23/2024 margo CUMMINGS GOAL(S)  Increase physical activity by stretching for 10 minutes 7 times over the next week   ACHIEVEMENT OF GOAL(S) : %    2.   Assess blood glucose readings to determine if readings are at target 2H PP dinner seven  times over the next week   ACHIEVEMENT OF GOAL(S) :  50-74%       DATE DSMES TOPIC EVALUATION     11/20/2024 HOW DO I FIND SUPPORT TO TACKLE THIS CONDITION?   Normal responses to diabetes diagnosis or complication   Shock   Anger & resentment   Guilt/self-blame   Sadness & worry   Depression    Anxiety   Pregnancy   Constructive strategies to normal responses    Exploring feelings & attitudes   Motivation: Cost

## 2024-12-16 ENCOUNTER — LAB (OUTPATIENT)
Age: 39
End: 2024-12-16

## 2024-12-16 DIAGNOSIS — E11.65 TYPE 2 DIABETES MELLITUS WITH HYPERGLYCEMIA, WITHOUT LONG-TERM CURRENT USE OF INSULIN (HCC): ICD-10-CM

## 2024-12-17 LAB — C PEPTIDE SERPL-MCNC: 2.9 NG/ML (ref 1.1–4.4)

## 2024-12-23 ENCOUNTER — OFFICE VISIT (OUTPATIENT)
Age: 39
End: 2024-12-23
Payer: MEDICAID

## 2024-12-23 VITALS
HEIGHT: 72 IN | TEMPERATURE: 97.4 F | DIASTOLIC BLOOD PRESSURE: 85 MMHG | BODY MASS INDEX: 38.93 KG/M2 | OXYGEN SATURATION: 96 % | HEART RATE: 67 BPM | SYSTOLIC BLOOD PRESSURE: 130 MMHG | WEIGHT: 287.4 LBS

## 2024-12-23 DIAGNOSIS — E11.65 TYPE 2 DIABETES MELLITUS WITH HYPERGLYCEMIA, WITHOUT LONG-TERM CURRENT USE OF INSULIN (HCC): Primary | ICD-10-CM

## 2024-12-23 PROCEDURE — 99214 OFFICE O/P EST MOD 30 MIN: CPT | Performed by: INTERNAL MEDICINE

## 2024-12-23 PROCEDURE — 3079F DIAST BP 80-89 MM HG: CPT | Performed by: INTERNAL MEDICINE

## 2024-12-23 PROCEDURE — 3075F SYST BP GE 130 - 139MM HG: CPT | Performed by: INTERNAL MEDICINE

## 2024-12-23 PROCEDURE — 3046F HEMOGLOBIN A1C LEVEL >9.0%: CPT | Performed by: INTERNAL MEDICINE

## 2024-12-23 RX ORDER — HYDROCHLOROTHIAZIDE 12.5 MG/1
TABLET ORAL
COMMUNITY

## 2024-12-23 RX ORDER — GLUCOSAMINE HCL/CHONDROITIN SU 500-400 MG
CAPSULE ORAL
Qty: 300 STRIP | Refills: 3 | Status: SHIPPED | OUTPATIENT
Start: 2024-12-23

## 2024-12-23 RX ORDER — DULAGLUTIDE 0.75 MG/.5ML
0.75 INJECTION, SOLUTION SUBCUTANEOUS WEEKLY
COMMUNITY

## 2024-12-23 NOTE — PROGRESS NOTES
Follow up - Diabetes     PCP: Caity Mazariegos FNP    Chief Complaint   Patient presents with    Diabetes     HPI:  Iain Duque Sr. is a 39 y.o. male with  has a past medical history of Diabetes insipidus (HCC), Essential hypertension, and EMEKA on CPAP. who is seen in consultation at the request of Caity Mazariegos FNP for evaluation of diabetes.     ER visit 9/10/24  \"presented to the ED with complaints of polydipsia, polyuria, and dizziness. Symptom onset began ~ 4 wks ago and progressively worsened over the past several days. Pt denied any diet changes, steroids or previous DM. Repeat BS>900 noted and insulin gtt initiated in ED. Education provided and hospitalist called w/o . Pt medically stable at the timeof admission\"   HHS now resolved off insulin drip     new onset diabetes 9/2024     Sister w/PRE-DM   Uncle w/DM   Eating plenty of junk food/sodas prior to admission   Now glucoses much improved, ranging 70-130s now   DM meds:   Metformin 1000 mg BID   Jardiance 10 mg daily  Trulicity 0.75 mg once a week     Checking TID - never over 110   Lowest 74     BRIEF ROS   Gen: no fevers/chills  CV: no chest pain  Resp: no shortness of breath, cough   GI: no nausea, emesis, abdominal pain  Neuro: no confusion       LABS/STUDIES:     No results found for: \"WRJ8YYUV\"    Lab Results   Component Value Date/Time    LABA1C 11.0 09/07/2024 03:40 PM    CREATININE 1.02 09/25/2024 11:48 AM    LABGLOM >90 09/25/2024 11:48 AM       No results found for: \"CHOL\", \"TRIG\", \"HDL\"    Lab Results   Component Value Date/Time    TSH 3.17 09/09/2024 04:00 AM       Lab Results   Component Value Date/Time    CPEPTIDE 2.9 12/16/2024 11:16 AM    CPEPTIDE 1.6 09/25/2024 11:48 AM       Current Outpatient Medications   Medication Sig Dispense Refill    TRULICITY 0.75 MG/0.5ML SOAJ SC injection Inject 0.5 mLs into the skin once a week      hydroCHLOROthiazide 12.5 MG tablet Take by mouth      blood glucose monitor strips Test

## 2024-12-23 NOTE — PROGRESS NOTES
Iain Duque  is a 39 y.o. male here for   Chief Complaint   Patient presents with    Diabetes       1. Have you been to the ER, urgent care clinic since your last visit?  Hospitalized since your last visit? -NO    2. Have you seen or consulted any other health care providers outside of the [facility] since your last visit?  Include any pap smears or colon screening.-PCP and cardiologist

## 2025-01-09 DIAGNOSIS — E11.65 TYPE 2 DIABETES MELLITUS WITH HYPERGLYCEMIA, WITHOUT LONG-TERM CURRENT USE OF INSULIN (HCC): ICD-10-CM

## 2025-01-09 RX ORDER — LANCETS 30 GAUGE
EACH MISCELLANEOUS
Qty: 100 EACH | Refills: 3 | Status: SHIPPED | OUTPATIENT
Start: 2025-01-09

## 2025-03-17 ENCOUNTER — LAB (OUTPATIENT)
Age: 40
End: 2025-03-17

## 2025-03-17 DIAGNOSIS — E11.65 TYPE 2 DIABETES MELLITUS WITH HYPERGLYCEMIA, WITHOUT LONG-TERM CURRENT USE OF INSULIN (HCC): ICD-10-CM

## 2025-03-18 LAB
ALBUMIN SERPL-MCNC: 3.8 G/DL (ref 3.5–5)
ALBUMIN/GLOB SERPL: 0.8 (ref 1.1–2.2)
ALP SERPL-CCNC: 105 U/L (ref 45–117)
ALT SERPL-CCNC: 28 U/L (ref 12–78)
ANION GAP SERPL CALC-SCNC: 7 MMOL/L (ref 2–12)
AST SERPL-CCNC: 26 U/L (ref 15–37)
BILIRUB SERPL-MCNC: 0.5 MG/DL (ref 0.2–1)
BUN SERPL-MCNC: 20 MG/DL (ref 6–20)
BUN/CREAT SERPL: 18 (ref 12–20)
CALCIUM SERPL-MCNC: 9.9 MG/DL (ref 8.5–10.1)
CHLORIDE SERPL-SCNC: 104 MMOL/L (ref 97–108)
CO2 SERPL-SCNC: 29 MMOL/L (ref 21–32)
CREAT SERPL-MCNC: 1.09 MG/DL (ref 0.7–1.3)
CREAT UR-MCNC: 157 MG/DL
EST. AVERAGE GLUCOSE BLD GHB EST-MCNC: 114 MG/DL
GLOBULIN SER CALC-MCNC: 5 G/DL (ref 2–4)
GLUCOSE SERPL-MCNC: 83 MG/DL (ref 65–100)
HBA1C MFR BLD: 5.6 % (ref 4–5.6)
MICROALBUMIN UR-MCNC: 5.79 MG/DL
MICROALBUMIN/CREAT UR-RTO: 37 MG/G (ref 0–30)
POTASSIUM SERPL-SCNC: 4 MMOL/L (ref 3.5–5.1)
PROT SERPL-MCNC: 8.8 G/DL (ref 6.4–8.2)
SODIUM SERPL-SCNC: 140 MMOL/L (ref 136–145)

## 2025-03-24 ENCOUNTER — OFFICE VISIT (OUTPATIENT)
Age: 40
End: 2025-03-24
Payer: MEDICAID

## 2025-03-24 VITALS
SYSTOLIC BLOOD PRESSURE: 120 MMHG | WEIGHT: 282 LBS | BODY MASS INDEX: 38.19 KG/M2 | HEIGHT: 72 IN | OXYGEN SATURATION: 97 % | TEMPERATURE: 97.4 F | DIASTOLIC BLOOD PRESSURE: 71 MMHG | HEART RATE: 62 BPM

## 2025-03-24 DIAGNOSIS — E11.9 TYPE 2 DIABETES MELLITUS WITHOUT COMPLICATION, WITHOUT LONG-TERM CURRENT USE OF INSULIN: ICD-10-CM

## 2025-03-24 DIAGNOSIS — E10.9 KETOSIS PRONE DIABETES (HCC): Primary | ICD-10-CM

## 2025-03-24 PROCEDURE — 3074F SYST BP LT 130 MM HG: CPT | Performed by: INTERNAL MEDICINE

## 2025-03-24 PROCEDURE — 99214 OFFICE O/P EST MOD 30 MIN: CPT | Performed by: INTERNAL MEDICINE

## 2025-03-24 PROCEDURE — 3078F DIAST BP <80 MM HG: CPT | Performed by: INTERNAL MEDICINE

## 2025-03-24 PROCEDURE — 3044F HG A1C LEVEL LT 7.0%: CPT | Performed by: INTERNAL MEDICINE

## 2025-03-24 NOTE — PROGRESS NOTES
Iain Duque  is a 40 y.o. male here for   Chief Complaint   Patient presents with    Diabetes       1. Have you been to the ER, urgent care clinic since your last visit?  Hospitalized since your last visit? -no    2. Have you seen or consulted any other health care providers outside of the [facility] since your last visit?  Include any pap smears or colon screening.-pcp and cardiologist

## 2025-03-24 NOTE — PROGRESS NOTES
Follow up - Diabetes     PCP: Caity Mazariegos FNP    Chief Complaint   Patient presents with    Diabetes     HPI:  Iain Duque Sr. is a 40 y.o. male with  has a past medical history of Diabetes insipidus, Essential hypertension, and EMEKA on CPAP. who is seen in consultation at the request of Caity Mazariegos FNP for evaluation of diabetes.     ER visit 9/10/24  \"presented to the ED with complaints of polydipsia, polyuria, and dizziness. Symptom onset began ~ 4 wks ago and progressively worsened over the past several days. Pt denied any diet changes, steroids or previous DM. Repeat BS>900 noted and insulin gtt initiated in ED. Education provided and hospitalist called w/o . Pt medically stable at the timeof admission\"   HHS now resolved off insulin drip     new onset diabetes 9/2024     Sister w/PRE-DM   Uncle w/DM   Eating plenty of junk food/sodas prior to admission     Now much improved w/A1C 5.6% 3/2025     DM meds:   Metformin 500 mg BID   Trulicity 0.75 mg once a week     BRIEF ROS   Gen: no fevers/chills  CV: no chest pain  Resp: no shortness of breath, cough   GI: no nausea, emesis, abdominal pain  Neuro: no confusion     LABS/STUDIES:     No results found for: \"[id number]\"    Lab Results   Component Value Date/Time    LABA1C 5.6 03/17/2025 02:09 PM    LABA1C 11.0 09/07/2024 03:40 PM    CREATININE 1.09 03/17/2025 02:09 PM    LABGLOM 89 03/17/2025 02:09 PM       No results found for: \"CHOL\", \"TRIG\", \"HDL\"    Lab Results   Component Value Date/Time    TSH 3.17 09/09/2024 04:00 AM       Lab Results   Component Value Date/Time    CPEPTIDE 2.9 12/16/2024 11:16 AM    CPEPTIDE 1.6 09/25/2024 11:48 AM       Current Outpatient Medications   Medication Sig Dispense Refill    Lancets (ONETOUCH DELICA PLUS [id number]) MISC TEST ONCE DAILY 100 each 3    TRULICITY 0.75 MG/0.5ML SOAJ SC injection Inject 0.5 mLs into the skin once a week      hydroCHLOROthiazide 12.5 MG tablet Take by mouth      blood glucose

## 2025-03-28 LAB — ISLET CELL512 AB SER-ACNC: <7.5 U/ML

## 2025-03-30 LAB — ZNT8 AB: <15 U/ML

## 2025-03-31 ENCOUNTER — RESULTS FOLLOW-UP (OUTPATIENT)
Age: 40
End: 2025-03-31